# Patient Record
Sex: FEMALE | URBAN - METROPOLITAN AREA
[De-identification: names, ages, dates, MRNs, and addresses within clinical notes are randomized per-mention and may not be internally consistent; named-entity substitution may affect disease eponyms.]

---

## 2020-08-06 ENCOUNTER — OFFICE VISIT - RIVER FALLS (OUTPATIENT)
Dept: FAMILY MEDICINE | Facility: CLINIC | Age: 80
End: 2020-08-06

## 2022-02-15 NOTE — TELEPHONE ENCOUNTER
DIMITRY SYEDA M. : 1940  [birth year]2020 MRN: [id number]  AFTER HOURS PHONE NOTE: [phone]  Time Paged: 4:11 p.m.  Time Call Retuned: 4:13 p.m.   S: I spoke with the nurse at [facility].  He is reporting elevated blood sugar of 418.  It is noted that she has urinary tract infection and is on steroids.     P: Her orders are to give 6 units for elevated blood sugar.  They will continue to monitor and call if persistent highs so we can increase the dose of her insulin.    D:  10/03/2020  T:  10/05/2020                                                 Joss Liriano MD/sq